# Patient Record
Sex: MALE | Race: WHITE | URBAN - METROPOLITAN AREA
[De-identification: names, ages, dates, MRNs, and addresses within clinical notes are randomized per-mention and may not be internally consistent; named-entity substitution may affect disease eponyms.]

---

## 2018-08-02 ENCOUNTER — OFFICE VISIT (OUTPATIENT)
Dept: NEUROLOGY | Age: 83
End: 2018-08-02

## 2018-08-02 DIAGNOSIS — F43.21 ADJUSTMENT DISORDER WITH DEPRESSED MOOD: ICD-10-CM

## 2018-08-02 DIAGNOSIS — R41.3 SHORT-TERM MEMORY LOSS: ICD-10-CM

## 2018-08-02 DIAGNOSIS — G31.84 MILD COGNITIVE IMPAIRMENT: Primary | ICD-10-CM

## 2018-08-02 NOTE — PROGRESS NOTES
[address] [facility],5Th Floor  [address]   [place] [address] [place] [facility]   [phone] Office   [phone] Fax      Neuropsychology    Initial Diagnostic Interview Note      Referral:  Jose Daniel Moralez MD    Esme Huynh is a 80 y.o. right handed   male who was accompanied by his spouse to the initial clinical interview on 8/2/18. Please refer to his medical records for details pertaining to his history. Briefly, the patient reported that he completed a Master's Degree in Pharmacy without history of previously diagnosed LD and/or receipt of special education services. 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 He has noticed some changes in his memory. He forgets the content of conversations. Misplaces things. He is not 100% sure and defers to the spouse. No background stroke, meningitis/encephalitis, DAINA Fever, Lupus, Lyme, TBI, sz, etc.  Spouse notes he asks the same questions over and over. He forgets what they are going to do. HE asked spouse when they were going to move here and she had to let him know that they have moved in the house for 30 years. This has been going on since at least April. Spouse goes to [place] to be with her daughter for 3 months and then back here for 3 months. Didn't notice any problems when she came back in January, but noticed a big difference since April. No known family history of any dementia, and this appears to be progressive and rapid. Spouse says he gets very upset, down, frustrated. No counseling or psychiatrist.  Ata Connor remember where he left his ring. Always putting things in the wrong place and putting things where he can't see them right away. No counseling or psychiatrist.  Yashira Ortega somewhat jokingly said he can't remember his name and started to cry, and he does not have a past history of depression.   Spouse puts a list of things for the entire week on the fridge and what they are doing. He has a habit of put things on the side of the fridge, and still asks what they are doing and where they are going. Seems to be doing okay with driving on a familiar road. If not, he struggles and gets upset. Reminders for meds. Spouse does the finances. Day-to-day chores okay. He makes sandwiches and such when she is gone. He volunteers at [facility] [employer] (he said it was [facility]) two days per week. He was on Namzeric for more than a year though, and shows no slowing down of this? Neuropsychological Mental Status Exam (NMSE):  Historian: Good  Praxis: No UE apraxia  R/L Orientation: Intact to self and to other  Dress: within normal limits   Weight: within normal limits   Appearance/Hygiene: within normal limits   Gait: within normal limits   Assistive Devices: Glasses  Mood: within normal limits   Affect: within normal limits   Comprehension: within normal limits   Thought Process: within normal limits   Expressive Language: within normal limits   Receptive Language: within normal limits   Motor:  No cognitive or motor perseveration  ETOH:  Tobacco:  Illicit:  SI/HI:  Psychosis:  Insight: Within normal limits  Judgment: Within normal limits  Other Psych:      Past Medical History:   Diagnosis Date    Hypercholesterolemia        History reviewed. No pertinent surgical history. No Known Allergies    Family History   Problem Relation Age of Onset    No Known Problems Mother     No Known Problems Father        Social History   Substance Use Topics    Smoking status: Never Smoker    Smokeless tobacco: Never Used    Alcohol use No             Plan:  Obtain authorization for testing from [employer]. Report to follow once testing, scoring, and interpretation completed. ? Organic based neurocognitive issues versus mood disorder or combination of same. ? Problems organic, functional, or both?  This note will not be viewable in [place]. 80year old with progressive decline in cognition. Rapid decline since April and no known medical explanation for this. Getting depressed and tearful and frustrated with all of this.

## 2018-08-10 ENCOUNTER — OFFICE VISIT (OUTPATIENT)
Dept: NEUROLOGY | Age: 83
End: 2018-08-10

## 2018-08-10 DIAGNOSIS — G30.1 LATE ONSET ALZHEIMER'S DISEASE WITHOUT BEHAVIORAL DISTURBANCE (HCC): Primary | ICD-10-CM

## 2018-08-10 DIAGNOSIS — F32.A MILD DEPRESSION: ICD-10-CM

## 2018-08-10 DIAGNOSIS — F02.80 LATE ONSET ALZHEIMER'S DISEASE WITHOUT BEHAVIORAL DISTURBANCE (HCC): Primary | ICD-10-CM

## 2018-08-13 NOTE — PROGRESS NOTES
[address] [facility],5Th Floor  [address]   [place] [address] [facility]    [phone] Office   [phone] Fax      Neuropsychological Evaluation Report  Referral:  vEe Coe MD    Shaggy Small is a 80 y.o. right handed   male who was accompanied by his spouse to the initial clinical interview on 8/2/18. Please refer to his medical records for details pertaining to his history. Briefly, the patient reported that he completed a Master's Degree in Pharmacy without history of previously diagnosed LD and/or receipt of special education services. 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 He has noticed some changes in his memory. He forgets the content of conversations. Misplaces things. He is not 100% sure and defers to the spouse. No background stroke, meningitis/encephalitis, DAINA Fever, Lupus, Lyme, TBI, sz, etc.  Spouse notes he asks the same questions over and over. He forgets what they are going to do. HE asked spouse when they were going to move here and she had to let him know that they have moved in the house for 30 years. This has been going on since at least April. Spouse goes to [place] to be with her daughter for 3 months and then back here for 3 months. Didn't notice any problems when she came back in January, but noticed a big difference since April. No known family history of any dementia, and this appears to be progressive and rapid. Spouse says he gets very upset, down, frustrated. No counseling or psychiatrist.  Bethel Mtz remember where he left his ring. Always putting things in the wrong place and putting things where he can't see them right away. No counseling or psychiatrist.  Link Bowser somewhat jokingly said he can't remember his name and started to cry, and he does not have a past history of depression.   Spouse puts a list of things for the entire week on the fridge and what they are doing. He has a habit of put things on the side of the fridge, and still asks what they are doing and where they are going. Seems to be doing okay with driving on a familiar road. If not, he struggles and gets upset. Reminders for meds. Spouse does the finances. Day-to-day chores okay. He makes sandwiches and such when she is gone. He volunteers at [facility] (he said it was [facility]) two days per week. He was on Namzeric for more than a year though, and shows no slowing down of this? Neuropsychological Mental Status Exam (NMSE):  Historian: Good  Praxis: No UE apraxia  R/L Orientation: Intact to self and to other  Dress: within normal limits   Weight: within normal limits   Appearance/Hygiene: within normal limits   Gait: within normal limits   Assistive Devices: Glasses  Mood: within normal limits   Affect: within normal limits   Comprehension: within normal limits   Thought Process: within normal limits   Expressive Language: within normal limits   Receptive Language: within normal limits   Motor:  No cognitive or motor perseveration  ETOH: Denied  Tobacco: Denied  Illicit: Denied  SI/HI: Denied  Psychosis: Denied  Insight: Within normal limits  Judgment: Within normal limits  Other Psych:      Past Medical History:   Diagnosis Date    Hypercholesterolemia        History reviewed. No pertinent surgical history. No Known Allergies    Family History   Problem Relation Age of Onset    No Known Problems Mother     No Known Problems Father        Social History   Substance Use Topics    Smoking status: Never Smoker    Smokeless tobacco: Never Used    Alcohol use No             Plan:  Obtain authorization for testing from [employer]. Report to follow once testing, scoring, and interpretation completed. ? Organic based neurocognitive issues versus mood disorder or combination of same. ? Problems organic, functional, or both?  This note will not be viewable in [address] 80year old with progressive decline in cognition. Rapid decline since April and no known medical explanation for this. Getting depressed and tearful and frustrated with all of this. Neuropsychological Test Results  Patient Testing 8/10/18 Report Completed 8/13/18  A Psychometrist Assisted w/ portions of this evaluation while under my direct  supervision    The following evaluation procedures/tests were administered:      Neuropsychologist Administered/Interpreted:  Neuropsychological Mental Status Exam, Revised Memory & Behavior Checklist,  Mini Mental Status Exam, Clock Drawing Test, Test Of Premorbid Functioning, Peña-Melzack Pain Questionnaire, History Taking  & Clinical Interview With The Patient, Additional History Taking w/ The Patient's Spouse, ABAS-3, CASE, Review Of Available Records. Psychometrist Administered under Neuropsychologist Supervision & Neuropsychologist Interpreted:  Verbal Fluency Tests, Micheal & Micheal - Revised, Trailmaking Test Parts A & B, Wechsler Adult Intelligence Scale - IV, Nellie Continuous Performance Test - III, [place] [employer] - 3, Grooved Pegboard, Fleming Depression Inventory - II, Fleming Anxiety Inventory, Personality Assessment Inventory. Test Findings:  Test Findings:  Note:  The patients raw data have been compared with currently available norms which include demographic corrections for age, gender, and/or education. Sometimes, the patients scores are compared to demographically similar individuals as close to the patients age, education level, etc., as possible. \"Average\" is viewed as being +/- 1 standard deviation (SD) from the stated mean for a particular test score. \"Low average\" is viewed as being between 1 and 2 SD below the mean, and above average is viewed as being 1 and 2 SD above the mean.   Scores falling in the borderline range (between 1-1/2 and 2 SD below the mean) are viewed with particular attention as to whether they are normal or abnormal neurocognitive test scores. Other methods of inference in analyzing the test data are also utilized, including the pattern and range of scores in the profile, bilateral motor functions, and the presence, if any, of pathognomonic signs. Behaviorally, the patient was friendly and cooperative and appeared motivated to perform well during this examination. Within this context, the results of this evaluation are viewed as a valid reflection of the patients actual neurocognitive and emotional status. His MMSE score of 24/30 correct was impaired. In this regard, he was not oriented to year, date, county, city, or floor. Recall for three words after a brief delay was 2/3 correct. Clock drawing was normal.        His structured word list fluency, as assessed by the FAS Test, was within the mildly to moderately impaired range with a T score of 34. Category fluency was within the severely impaired range with a T score of 19. Confrontation naming ability, as assessed by the [facility] - Revised, was within the mildly impaired range at 43/60 correct (T = 35). This pattern of performance is indicative of a patient who is at increased risk for day-to-day problems with verbal fluency and confrontation naming. The patient was administered the Nellie Continuous Performance Test - III,a computer administered test of sustained attention, and review of the subscales within this instrument revealed numerous concerns for inattentiveness without impulsivity. This pattern of performance is indicative of a patient who is at increased risk for day-to-day problems with sustained visual attention/concentration. The patient is not showing problems with working memory capacity (37th %ile) or processing speed (30th  %ile) on the WAIS-IV. His Verbal Comprehension Index score of 107 was average.   His Perceptual Reasoning Index score of 98 was average. These scores do not reflect a decline in functioning based on an assessment of premorbid functioning. The patient was administered the [place] Verbal Learning Test  - 3 and generated a borderline range (and positive) learning curve over five repeated auditory word list learning trials. An interference trial was within the normal range. Free and cued, short and long delayed recall were all impaired. Recognition recall was impaired, as was his forced choice recall. No concern for malingering, however. This pattern of performance is indicative of a patient who is at increased risk for day-to-day problems with auditory learning and memory. Simple timed visual motor sequencing (Trailmaking Test Part A) was within the average range with a T score of 51. His performance on a similar, but more complex task of timed visual motor sequencing (Trailmaking Test Part B) was within the average range with a T score of 48. Taken together, this pattern of performance is not indicative of a patient who is at increased risk for day-to-day problems with executive functioning. Fine motor dexterity was within the mildly impaired range for his dominant hand (T = 38) and mildly to moderately impaired for his nondominant hand (T = 34). Neurologic correlation is indicated with respect to possible lateralization issues. The patient rated his current level of pain as \"0/5- No Pain\" on the Peña-Melzack Pain Questionnaire. His Fleming Depression Inventory- II score of 14 was within the mildly depressed range. His Fleming Anxiety Inventory score of 2 reflected minimal anxiety. The patient was administered the Personality Assessment Inventory and generated a valid profile for interpretation. Within this context, his self-concept is quite harsh and negative. He is inwardly more troubled by self-doubt and misgivings about his adequacy than readily apparent to others.   The personality profile is otherwise normal.       The family completed the ABAS -3 and did not report major concerns regarding the patient's general adaptive skills (16th %ile), social skills (9h %ile), conceptual skills (10th %ile), or practical skills (25th %ile). ,  On the CASE, the family reported clinically significant concerns regarding the patient's cognitive abilities. Impressions & Recommendations: This patient generated an abnormal range Neuropsychological Evaluation with respect to neurocognitive functioning. In this regard, he is showing impairments with mental status, verbal fluency, confrontation naming, sustained attention,auditory memory, and bilateral fine motor dexterity (the latter potentially reflects lateralization for which neurologic correlation is indicated. At the same time, his verbal comprehension, perceptual reasoning, working memory, processing speed, and executive functioning abilities remain normal.  From an emotional standpoint, there is support for at-least mild depression. In my opinion, this profile is consistent with an evolving organic process that is currently at a mild to moderate level of severity. This is likely AD though a mixed AD and vascular dementia has not been ruled out. It is highly unlikely that these problems have been going on only since April, but he likely has been declining since then. He was on medication for memory for about a year, is my understanding. I suggest a review of his current memory management medication. Consider an appropriate medication for attention as well if not medically contraindicated. Counseling and medication for depression is also advised. Of course, any reversible medical cause for memory loss needs to be ruled out, but the evidence here points to a dementia process. The profile is not consistent with pseudodementia. The patient should be encouraged to remain as mentally, socially, and physically active as possible.        The patient's generated cognitive difficulties are significant to the degree whereby it is my opinion that he should not live independently without appropriate supervision for those domains with a heavy memory emphasis. This includes medication management supervision and supervision of financial dealings. He is also showing problems with sustained attention which raises possible driving safety concerns. Consider a formal evaluation of driving safety. I am concerned that the spouse leaves for three months at a time with the patient alone at home. Consider in-home health services or informal companionship/check ins on a routine basis when she is away. For now, the severity of dementia is mild. I find him competent at this time. Baseline now established. Follow up prn. Clinical correlation is, of course, indicated. I will discuss these findings with the patient and family when they follow up with me in the near future. A follow up Neuropsychological Evaluation is indicated on a prn basis. DIAGNOSES: Dementia - Mild     Depression -  Mild     The above information is based upon information currently available to me. If there is any additional information of which I am currently unaware, I would be more than happy to review it upon having it made available to me. Thank you for the opportunity to see this interesting individual.     Sincerely,       Asiya Arana. Darryl Segura PsyD, EdS      Attachments:  IQ Test Results (In Media Section Of This EMR)    Cc: Makenna Frazier MD    2 units -59442- 1.75 hours Record review. Review of history provided by patient. Review of collaborative information. Testing by Clinician. Review of raw data. Scoring. Report writing of individual tests administered by Clinician.   Integration of individual tests administered by psychometrist (that were previously reported and billed under psychometry code below) with testing by clinician and review of records/history/collaborative information. Case Conceptualization, Report writing. Coordination Of Care. 4 units  -23178 - 3.75 hours Psychometrist test prep, administration, and scoring under clinician's direct supervision. Clinical interpretation of individual tests administered by psychometrist .  Clinician report of individual tests administered by psychometrist.    \"Unit\" is defined by CPT/National Guidelines (31 - 60 minutes). Integral services including scoring of raw data, data interpretation, case conceptualization, report writing etcetera were initiated after the patient finished testing/raw data collected and was completed on the date the report was signed.